# Patient Record
Sex: MALE | Race: BLACK OR AFRICAN AMERICAN | URBAN - METROPOLITAN AREA
[De-identification: names, ages, dates, MRNs, and addresses within clinical notes are randomized per-mention and may not be internally consistent; named-entity substitution may affect disease eponyms.]

---

## 2017-03-10 ENCOUNTER — OFFICE VISIT (OUTPATIENT)
Dept: FAMILY MEDICINE CLINIC | Age: 57
End: 2017-03-10

## 2017-03-10 VITALS
SYSTOLIC BLOOD PRESSURE: 110 MMHG | HEIGHT: 70 IN | DIASTOLIC BLOOD PRESSURE: 70 MMHG | HEART RATE: 75 BPM | RESPIRATION RATE: 18 BRPM | TEMPERATURE: 96.3 F | OXYGEN SATURATION: 97 % | BODY MASS INDEX: 24.17 KG/M2 | WEIGHT: 168.8 LBS

## 2017-03-10 DIAGNOSIS — E11.65 TYPE 2 DIABETES MELLITUS WITH HYPERGLYCEMIA, WITH LONG-TERM CURRENT USE OF INSULIN (HCC): Primary | ICD-10-CM

## 2017-03-10 DIAGNOSIS — Z79.4 TYPE 2 DIABETES MELLITUS WITH HYPERGLYCEMIA, WITH LONG-TERM CURRENT USE OF INSULIN (HCC): Primary | ICD-10-CM

## 2017-03-10 NOTE — PROGRESS NOTES
Juwan Clarke is a 59 y.o. male presents to office for DM, HTN and high cholesterol. 1. Have you been to the ER, urgent care clinic or hospitalized since your last visit? no  2. Have you seen any other providers outside of [facility] since your last visit? no  3. Have you had a Flu shot this year?  Pt declined       Health Maintenance items with a due date reviewed with patient:  Health Maintenance Due   Topic Date Due    EYE EXAM RETINAL OR DILATED Q1  02/03/1968    Pneumococcal 19-64 Medium Risk (1 of 1 - PPSV23) 02/03/1977    DTaP/Tdap/Td series (1 - Tdap) 02/03/1979    FOOT EXAM Q1  07/01/2016    INFLUENZA AGE 9 TO ADULT  08/01/2016

## 2017-03-10 NOTE — PROGRESS NOTES
Celio Muhammad is a 59 y.o.  male and presents for F/U for HTN, DM2 and high chol. Chief Complaint   Patient presents with    Diabetes    Hypertension    Cholesterol Problem     Subjective: Additional Concerns: none    Patient Active Problem List    Diagnosis Date Noted    Need for hepatitis C screening test 09/08/2015    DM (diabetes mellitus), type 2, uncontrolled ([facility] 05/01/2015    HTN (hypertension) 05/01/2015     Current Outpatient Prescriptions   Medication Sig Dispense Refill    lisinopril (PRINIVIL, ZESTRIL) 20 mg tablet Take 1 Tab by mouth daily. 90 Tab 1    insulin glargine (LANTUS SOLOSTAR) 100 unit/mL (3 mL) pen Inject 38 U SC  Indications: Diabetes Mellitus 10 Each 10    insulin aspart (NOVOLOG FLEXPEN) 100 unit/mL inpn Use 10 units with each meal 5 Pen 10    Insulin Needles, Disposable, (CHLOÉ PEN NEEDLE) 32 gauge x 5/32\" ndle Use 4 times daily with insulin 360 Pen Needle 0    atorvastatin (LIPITOR) 20 mg tablet Take 1 Tab by mouth daily. This replaces previously ordered Crestor. Indications: hyperlipidemia 90 Tab 1    glucose blood VI test strips (ONETOUCH VERIO) strip Pt to test 1x a day.  100 Strip 6    insulin lispro (HUMALOG) 100 unit/mL kwikpen Inject 6U SC before each meal 2 Package 10     No Known Allergies  Past Medical History:   Diagnosis Date    Diabetes ([facility]    [facility] H/O splenectomy     Hypertension      Past Surgical History:   Procedure Laterality Date    HX CHOLECYSTECTOMY       Family History   Problem Relation Age of Onset    Diabetes Mother     Hypertension Mother    [facility] Elevated Lipids Mother     Diabetes Father     Prostate Cancer Father      Social History   Substance Use Topics    Smoking status: Never Smoker    Smokeless tobacco: Never Used    Alcohol use No     ROS     General: negative for - chills, fatigue, fever, weight change  Endo: negative for - hot flashes, polydipsia/polyuria or temperature intolerance  Resp: negative for - cough, shortness of breath or wheezing  CV: negative for - chest pain, edema or palpitations  GI: negative for - abdominal pain, change in bowel habits, constipation, diarrhea or nausea/vomiting  MSK: negative for - joint pain, joint swelling or muscle pain  Neuro: negative for - confusion, headaches, seizures or weakness    Objective:  Vitals:    03/10/17 1306   BP: 110/70   Pulse: 75   Resp: 18   Temp: 96.3 °F (35.7 °C)   TempSrc: Oral   SpO2: 97%   Weight: 168 lb 12.8 oz (76.6 kg)   Height: 5' 10\" (1.778 m)   PainSc:   0 - No pain     PE    Alert, well appearing, and in no distress, oriented to person, place, and time and normal appearing weight  Mental status - alert, oriented to person, place, and time, normal mood, behavior, speech, dress, motor activity, and thought processes  Musculoskeletal - no joint tenderness, deformity or swelling  Extremities - peripheral pulses normal, no pedal edema, no clubbing or cyanosis    [facility]   Component Date Value Ref Range Status    LIPID PROFILE 11/25/2016        Final    Cholesterol, total 11/25/2016 220* <200 MG/DL Final    Triglyceride 11/25/2016 67  <150 MG/DL Final    HDL Cholesterol 11/25/2016 132* 40 - 60 MG/DL Final    LDL, calculated 11/25/2016 74.6  0 - 100 MG/DL Final    VLDL, calculated 11/25/2016 13.4  MG/DL Final    CHOL/HDL Ratio 11/25/2016 1.7  0 - 5.0   Final    WBC 11/25/2016 4.4* 4.6 - 13.2 K/uL Final    RBC 11/25/2016 5.38  4.70 - 5.50 M/uL Final    HGB 11/25/2016 15.6  13.0 - 16.0 g/dL Final    HCT 11/25/2016 46.1  36.0 - 48.0 % Final    MCV 11/25/2016 85.7  74.0 - 97.0 FL Final    MCH 11/25/2016 29.0  24.0 - 34.0 PG Final    MCHC 11/25/2016 33.8  31.0 - 37.0 g/dL Final    RDW 11/25/2016 12.9  11.6 - 14.5 % Final    PLATELET 97/51/1232 472  135 - 420 K/uL Final    MPV 11/25/2016 11.5  9.2 - 11.8 FL Final    NEUTROPHILS 11/25/2016 29* 40 - 73 % Final    LYMPHOCYTES 11/25/2016 60* 21 - 52 % Final  MONOCYTES 11/25/2016 8  3 - 10 % Final    EOSINOPHILS 11/25/2016 2  0 - 5 % Final    BASOPHILS 11/25/2016 1  0 - 2 % Final    ABS. NEUTROPHILS 11/25/2016 1.3* 1.8 - 8.0 K/UL Final    ABS. LYMPHOCYTES 11/25/2016 2.7  0.9 - 3.6 K/UL Final    ABS. MONOCYTES 11/25/2016 0.4  0.05 - 1.2 K/UL Final    ABS. EOSINOPHILS 11/25/2016 0.1  0.0 - 0.4 K/UL Final    ABS. BASOPHILS 11/25/2016 0.0  0.0 - 0.06 K/UL Final    DF 11/25/2016 AUTOMATED    Final    Sodium 11/25/2016 140  136 - 145 mmol/L Final    Potassium 11/25/2016 4.3  3.5 - 5.5 mmol/L Final    Chloride 11/25/2016 99* 100 - 108 mmol/L Final    CO2 11/25/2016 29  21 - 32 mmol/L Final    Anion gap 11/25/2016 12  3.0 - 18 mmol/L Final    Glucose 11/25/2016 344* 74 - 99 mg/dL Final    BUN 11/25/2016 20* 7.0 - 18 MG/DL Final    Creatinine 11/25/2016 1.28  0.6 - 1.3 MG/DL Final    BUN/Creatinine ratio 11/25/2016 16  12 - 20   Final    GFR est AA 11/25/2016 >60  >60 ml/min/1.73m2 Final    GFR est non-AA 11/25/2016 58* >60 ml/min/1.73m2 Final    Comment: (NOTE)  Estimated GFR is calculated using the Modification of Diet in Renal   Disease (MDRD) Study equation, reported for both  Americans   (GFRAA) and non- Americans (GFRNA), and normalized to 1.73m2   body surface area. The physician must decide which value applies to   the patient. The MDRD study equation should only be used in   individuals age 25 or older. It has not been validated for the   following: pregnant women, patients with serious comorbid conditions,   or on certain medications, or persons with extremes of body size,   muscle mass, or nutritional status.  Calcium 11/25/2016 9.2  8.5 - 10.1 MG/DL Final    Bilirubin, total 11/25/2016 0.5  0.2 - 1.0 MG/DL Final    ALT (SGPT) 11/25/2016 22  16 - 61 U/L Final    AST (SGOT) 11/25/2016 15  15 - 37 U/L Final    Alk.  phosphatase 11/25/2016 91  45 - 117 U/L Final    Protein, total 11/25/2016 8.2  6.4 - 8.2 g/dL Final    Albumin 11/25/2016 4.0  3.4 - 5.0 g/dL Final    Globulin 11/25/2016 4.2* 2.0 - 4.0 g/dL Final    A-G Ratio 11/25/2016 1.0  0.8 - 1.7   Final    Prostate Specific Ag 11/25/2016 1.3  0.0 - 4.0 ng/mL Final    TSH 11/25/2016 1.10  0.36 - 3.74 uIU/mL Final    Hemoglobin A1c 11/25/2016 >16.0* 4.2 - 5.6 % Final    Comment: (NOTE)  HbA1C Interpretive Ranges  <5.7              Normal  5.7 - 6.4         Consider Prediabetes  >6.5              Consider Diabetes      Est. average glucose 11/25/2016 CANNOT BE CALCULATED  mg/dL Final    Estimated average glucose results are not reported when the hemoglobin A1c is <5% or >15% since it has not been validated for values in these ranges. TESTS  Results for orders placed or performed during the hospital encounter of 11/25/16   LIPID PANEL   Result Value Ref Range    LIPID PROFILE          Cholesterol, total 220 (H) <200 MG/DL    Triglyceride 67 <150 MG/DL    HDL Cholesterol 132 (H) 40 - 60 MG/DL    LDL, calculated 74.6 0 - 100 MG/DL    VLDL, calculated 13.4 MG/DL    CHOL/HDL Ratio 1.7 0 - 5.0     CBC WITH AUTOMATED DIFF   Result Value Ref Range    WBC 4.4 (L) 4.6 - 13.2 K/uL    RBC 5.38 4.70 - 5.50 M/uL    HGB 15.6 13.0 - 16.0 g/dL    HCT 46.1 36.0 - 48.0 %    MCV 85.7 74.0 - 97.0 FL    MCH 29.0 24.0 - 34.0 PG    MCHC 33.8 31.0 - 37.0 g/dL    RDW 12.9 11.6 - 14.5 %    PLATELET 582 792 - 389 K/uL    MPV 11.5 9.2 - 11.8 FL    NEUTROPHILS 29 (L) 40 - 73 %    LYMPHOCYTES 60 (H) 21 - 52 %    MONOCYTES 8 3 - 10 %    EOSINOPHILS 2 0 - 5 %    BASOPHILS 1 0 - 2 %    ABS. NEUTROPHILS 1.3 (L) 1.8 - 8.0 K/UL    ABS. LYMPHOCYTES 2.7 0.9 - 3.6 K/UL    ABS. MONOCYTES 0.4 0.05 - 1.2 K/UL    ABS. EOSINOPHILS 0.1 0.0 - 0.4 K/UL    ABS.  BASOPHILS 0.0 0.0 - 0.06 K/UL    DF AUTOMATED     METABOLIC PANEL, COMPREHENSIVE   Result Value Ref Range    Sodium 140 136 - 145 mmol/L    Potassium 4.3 3.5 - 5.5 mmol/L    Chloride 99 (L) 100 - 108 mmol/L    CO2 29 21 - 32 mmol/L    Anion gap 12 3.0 - 18 mmol/L    Glucose 344 (H) 74 - 99 mg/dL    BUN 20 (H) 7.0 - 18 MG/DL    Creatinine 1.28 0.6 - 1.3 MG/DL    BUN/Creatinine ratio 16 12 - 20      GFR est AA >60 >60 ml/min/1.73m2    GFR est non-AA 58 (L) >60 ml/min/1.73m2    Calcium 9.2 8.5 - 10.1 MG/DL    Bilirubin, total 0.5 0.2 - 1.0 MG/DL    ALT (SGPT) 22 16 - 61 U/L    AST (SGOT) 15 15 - 37 U/L    Alk. phosphatase 91 45 - 117 U/L    Protein, total 8.2 6.4 - 8.2 g/dL    Albumin 4.0 3.4 - 5.0 g/dL    Globulin 4.2 (H) 2.0 - 4.0 g/dL    A-G Ratio 1.0 0.8 - 1.7     PSA DIAGNOSTIC (PROSTATIC SPECIFIC AG)   Result Value Ref Range    Prostate Specific Ag 1.3 0.0 - 4.0 ng/mL   TSH 3RD GENERATION   Result Value Ref Range    TSH 1.10 0.36 - 3.74 uIU/mL   HEMOGLOBIN A1C WITH EAG   Result Value Ref Range    Hemoglobin A1c >16.0 (H) 4.2 - 5.6 %    Est. average glucose CANNOT BE CALCULATED mg/dL     Assessment/Plan:      DM2 uncontrolled - monitoring labs as ordered. Lab review: no lab studies available for review at time of visit    I have discussed the diagnosis with the patient and the intended plan as seen in the above orders. The patient has received an after-visit summary and questions were answered concerning future plans. I have discussed medication side effects and warnings with the patient as well. I have reviewed the plan of care with the patient, accepted their input and they are in agreement with the treatment goals.      F/U as needed    Raiza Durant MD

## 2017-03-10 NOTE — MR AVS SNAPSHOT
Visit Information Date & Time Provider Department Dept. Phone Encounter #  
 3/10/2017 12:45 PM Ruben Denton MD [facility] [place] [phone] [id number] Upcoming Health Maintenance Date Due  
 EYE EXAM RETINAL OR DILATED Q1 2/3/1968 Pneumococcal 19-64 Medium Risk (1 of 1 - PPSV23) 2/3/1977 DTaP/Tdap/Td series (1 - Tdap) 2/3/1979 FOOT EXAM Q1 7/1/2016 HEMOGLOBIN A1C Q6M 5/25/2017 MICROALBUMIN Q1 7/6/2017 LIPID PANEL Q1 11/25/2017 COLONOSCOPY 3/6/2027 Allergies as of 3/10/2017  Review Complete On: 3/10/2017 By: Alice Ferris LPN No Known Allergies Current Immunizations  Never Reviewed No immunizations on file. Not reviewed this visit You Were Diagnosed With   
  
 Codes Comments Type 2 diabetes mellitus with hyperglycemia, with long-term current use of insulin (HCC)    -  Primary ICD-10-CM: E11.65, Z79.4 ICD-9-CM: 250.00, 790.29, V58.67 Vitals BP Pulse Temp Resp Height(growth percentile) Weight(growth percentile) 110/70 (BP 1 Location: Right arm, BP Patient Position: Sitting) 75 96.3 °F (35.7 °C) (Oral) 18 5' 10\" (1.778 m) 168 lb 12.8 oz (76.6 kg) SpO2 BMI Smoking Status 97% 24.22 kg/m2 Never Smoker BMI and BSA Data Body Mass Index Body Surface Area  
 24.22 kg/m 2 1.95 m 2 Preferred Pharmacy Pharmacy Name Phone [facility] [phone] Your Updated Medication List  
  
   
This list is accurate as of: 3/10/17  1:17 PM.  Always use your most recent med list.  
  
  
  
  
 atorvastatin 20 mg tablet Commonly known as:  LIPITOR Take 1 Tab by mouth daily. This replaces previously ordered Crestor. Indications: hyperlipidemia  
  
 glucose blood VI test strips strip Commonly known as:  Ramírez Yung Pt to test 1x a day. insulin aspart 100 unit/mL Inpn Commonly known as:  Veryl Poke Use 10 units with each meal  
  
 insulin glargine 100 unit/mL (3 mL) pen Commonly known as:  LANTUS SOLOSTAR Inject 38 U SC  Indications: Diabetes Mellitus  
  
 insulin lispro 100 unit/mL kwikpen Commonly known as:  HUMALOG Inject 6U SC before each meal  
  
 Insulin Needles (Disposable) 32 gauge x 5/32\" Ndle Commonly known as:  Edith Pen Needle Use 4 times daily with insulin  
  
 lisinopril 20 mg tablet Commonly known as:  Beatriz [place] Take 1 Tab by mouth daily. To-Do List   
 03/10/2017 Lab:  HEMOGLOBIN A1C WITH EAG   
  
 03/10/2017 Lab:  LIPID PANEL   
  
 03/10/2017 Lab:  METABOLIC PANEL, COMPREHENSIVE Introducing [facility]! [employer] introduces [employer] patient portal. Now you can access parts of your medical record, email your doctor's office, and request medication refills online. 1. In your internet browser, go to [email or web address]. [email or web address] 2. Click on the First Time User? Click Here link in the Sign In box. You will see the New Member Sign Up page. 3. Enter your [employer] Access Code exactly as it appears below. You will not need to use this code after youve completed the sign-up process. If you do not sign up before the expiration date, you must request a new code. · [employer] Access Code: [email or web address] Expires: 6/8/2017  1:17 PM 
 
4. Enter the last four digits of your Social Security Number (xxxx) and Date of Birth (mm/dd/yyyy) as indicated and click Submit. You will be taken to the next sign-up page. 5. Create a [employer] ID. This will be your [employer] login ID and cannot be changed, so think of one that is secure and easy to remember. 6. Create a [employer] password. You can change your password at any time. 7. Enter your Password Reset Question and Answer. This can be used at a later time if you forget your password. 8. Enter your e-mail address. You will receive e-mail notification when new information is available in [address] 9. Click Sign Up. You can now view and download portions of your medical record. 10. Click the Download Summary menu link to download a portable copy of your medical information. If you have questions, please visit the Frequently Asked Questions section of the [employer] website. Remember, [employer] is NOT to be used for urgent needs. For medical emergencies, dial 911. Now available from your iPhone and Android! Please provide this summary of care documentation to your next provider. Your primary care clinician is listed as Clemente Talley. If you have any questions after today's visit, please call [phone].

## 2017-03-11 ENCOUNTER — HOSPITAL ENCOUNTER (OUTPATIENT)
Dept: LAB | Age: 57
Discharge: HOME | End: 2017-03-11
Payer: COMMERCIAL

## 2017-03-11 DIAGNOSIS — Z79.4 TYPE 2 DIABETES MELLITUS WITH HYPERGLYCEMIA, WITH LONG-TERM CURRENT USE OF INSULIN (HCC): ICD-10-CM

## 2017-03-11 DIAGNOSIS — E11.65 TYPE 2 DIABETES MELLITUS WITH HYPERGLYCEMIA, WITH LONG-TERM CURRENT USE OF INSULIN (HCC): ICD-10-CM

## 2017-03-11 LAB
ALBUMIN SERPL BCP-MCNC: 3.9 G/DL (ref 3.4–5)
ALBUMIN/GLOB SERPL: 1.1 {RATIO} (ref 0.8–1.7)
ALP SERPL-CCNC: 75 U/L (ref 45–117)
ALT SERPL-CCNC: 30 U/L (ref 16–61)
ANION GAP BLD CALC-SCNC: 10 MMOL/L (ref 3–18)
AST SERPL W P-5'-P-CCNC: 20 U/L (ref 15–37)
BILIRUB SERPL-MCNC: 0.4 MG/DL (ref 0.2–1)
BUN SERPL-MCNC: 17 MG/DL (ref 7–18)
BUN/CREAT SERPL: 15 (ref 12–20)
CALCIUM SERPL-MCNC: 8.8 MG/DL (ref 8.5–10.1)
CHLORIDE SERPL-SCNC: 103 MMOL/L (ref 100–108)
CHOLEST SERPL-MCNC: 217 MG/DL
CO2 SERPL-SCNC: 29 MMOL/L (ref 21–32)
CREAT SERPL-MCNC: 1.11 MG/DL (ref 0.6–1.3)
EST. AVERAGE GLUCOSE BLD GHB EST-MCNC: 372 MG/DL
GLOBULIN SER CALC-MCNC: 3.6 G/DL (ref 2–4)
GLUCOSE SERPL-MCNC: 363 MG/DL (ref 74–99)
HBA1C MFR BLD: 14.6 % (ref 4.2–5.6)
HDLC SERPL-MCNC: 119 MG/DL (ref 40–60)
HDLC SERPL: 1.8 {RATIO} (ref 0–5)
LDLC SERPL CALC-MCNC: 88 MG/DL (ref 0–100)
LIPID PROFILE,FLP: ABNORMAL
POTASSIUM SERPL-SCNC: 4.5 MMOL/L (ref 3.5–5.5)
PROT SERPL-MCNC: 7.5 G/DL (ref 6.4–8.2)
SODIUM SERPL-SCNC: 142 MMOL/L (ref 136–145)
TRIGL SERPL-MCNC: 50 MG/DL (ref ?–150)
VLDLC SERPL CALC-MCNC: 10 MG/DL

## 2017-03-11 PROCEDURE — 80061 LIPID PANEL: CPT | Performed by: FAMILY MEDICINE

## 2017-03-11 PROCEDURE — 36415 COLL VENOUS BLD VENIPUNCTURE: CPT | Performed by: FAMILY MEDICINE

## 2017-03-11 PROCEDURE — 83036 HEMOGLOBIN GLYCOSYLATED A1C: CPT | Performed by: FAMILY MEDICINE

## 2017-03-11 PROCEDURE — 80053 COMPREHEN METABOLIC PANEL: CPT | Performed by: FAMILY MEDICINE

## 2017-03-12 NOTE — PROGRESS NOTES
Pls inform patient his A1C is lower but still high at 14.6 from greater than 16. He was referred to Dr. Sammi Mueller before for this. Im not sure if he went or not or if he was   Satisfied with them. I feel I need to refer him back to Endo for uncontrolled DM2 on basal bolus insulin. In the meantime, please increase lantus another 10 units if he is not having episodes of low blood sugar.

## 2017-03-13 ENCOUNTER — TELEPHONE (OUTPATIENT)
Dept: FAMILY MEDICINE CLINIC | Age: 57
End: 2017-03-13

## 2017-03-13 DIAGNOSIS — E11.8 UNCONTROLLED TYPE 2 DIABETES MELLITUS WITH COMPLICATION, UNSPECIFIED LONG TERM INSULIN USE STATUS: Primary | ICD-10-CM

## 2017-03-13 DIAGNOSIS — E11.65 UNCONTROLLED TYPE 2 DIABETES MELLITUS WITH COMPLICATION, UNSPECIFIED LONG TERM INSULIN USE STATUS: Primary | ICD-10-CM

## 2017-03-13 NOTE — PATIENT INSTRUCTIONS

## 2017-03-13 NOTE — TELEPHONE ENCOUNTER
----- Message from Rosanna Story MD sent at 3/12/2017  7:33 PM EDT -----  Pls inform patient his A1C is lower but still high at 14.6 from greater than 16. He was referred to Dr. Kandy Perkins before for this. Im not sure if he went or not or if he was   Satisfied with them. I feel I need to refer him back to Endo for uncontrolled DM2 on basal bolus insulin. In the meantime, please increase lantus another 10 units if he is not having episodes of low blood sugar.

## 2017-03-13 NOTE — TELEPHONE ENCOUNTER
Patient has been called and notified of results. Patient stated that he will increase his insulin and that he would like another referral to Endo. Referral has been pended. Please advise.

## 2017-05-21 NOTE — TELEPHONE ENCOUNTER
Per record patient has existing referras to Endo 3/16/17(Malorie) and 11/25/16 (Yogesh Taylor) also Clesridevi Taylor the previous year.

## 2017-09-05 ENCOUNTER — OFFICE VISIT (OUTPATIENT)
Dept: FAMILY MEDICINE CLINIC | Age: 57
End: 2017-09-05

## 2017-09-05 ENCOUNTER — HOSPITAL ENCOUNTER (OUTPATIENT)
Dept: LAB | Age: 57
Discharge: HOME | End: 2017-09-05
Payer: COMMERCIAL

## 2017-09-05 VITALS
DIASTOLIC BLOOD PRESSURE: 83 MMHG | HEART RATE: 82 BPM | SYSTOLIC BLOOD PRESSURE: 119 MMHG | TEMPERATURE: 96.7 F | WEIGHT: 154.8 LBS | OXYGEN SATURATION: 99 % | HEIGHT: 70 IN | RESPIRATION RATE: 18 BRPM | BODY MASS INDEX: 22.16 KG/M2

## 2017-09-05 DIAGNOSIS — E78.5 HYPERLIPIDEMIA, UNSPECIFIED HYPERLIPIDEMIA TYPE: ICD-10-CM

## 2017-09-05 DIAGNOSIS — R63.4 WEIGHT LOSS, UNINTENTIONAL: Primary | ICD-10-CM

## 2017-09-05 DIAGNOSIS — R63.4 WEIGHT LOSS, UNINTENTIONAL: ICD-10-CM

## 2017-09-05 LAB
ALBUMIN SERPL-MCNC: 3.8 G/DL (ref 3.4–5)
ALBUMIN/GLOB SERPL: 1.1 {RATIO} (ref 0.8–1.7)
ALP SERPL-CCNC: 66 U/L (ref 45–117)
ALT SERPL-CCNC: 19 U/L (ref 16–61)
ANION GAP SERPL CALC-SCNC: 8 MMOL/L (ref 3–18)
AST SERPL-CCNC: 13 U/L (ref 15–37)
BASOPHILS # BLD: 0 K/UL (ref 0–0.06)
BASOPHILS NFR BLD: 0 % (ref 0–2)
BILIRUB SERPL-MCNC: 0.5 MG/DL (ref 0.2–1)
BUN SERPL-MCNC: 12 MG/DL (ref 7–18)
BUN/CREAT SERPL: 13 (ref 12–20)
CALCIUM SERPL-MCNC: 8.8 MG/DL (ref 8.5–10.1)
CHLORIDE SERPL-SCNC: 106 MMOL/L (ref 100–108)
CHOLEST SERPL-MCNC: 177 MG/DL
CO2 SERPL-SCNC: 28 MMOL/L (ref 21–32)
CREAT SERPL-MCNC: 0.94 MG/DL (ref 0.6–1.3)
CREAT UR-MCNC: 177.6 MG/DL (ref 30–125)
DIFFERENTIAL METHOD BLD: ABNORMAL
EOSINOPHIL # BLD: 0.1 K/UL (ref 0–0.4)
EOSINOPHIL NFR BLD: 2 % (ref 0–5)
ERYTHROCYTE [DISTWIDTH] IN BLOOD BY AUTOMATED COUNT: 13.2 % (ref 11.6–14.5)
EST. AVERAGE GLUCOSE BLD GHB EST-MCNC: 349 MG/DL
GLOBULIN SER CALC-MCNC: 3.4 G/DL (ref 2–4)
GLUCOSE SERPL-MCNC: 157 MG/DL (ref 74–99)
HBA1C MFR BLD: 13.8 % (ref 4.2–5.6)
HCT VFR BLD AUTO: 45.8 % (ref 36–48)
HDLC SERPL-MCNC: 112 MG/DL (ref 40–60)
HDLC SERPL: 1.6 {RATIO} (ref 0–5)
HGB BLD-MCNC: 15.7 G/DL (ref 13–16)
LDLC SERPL CALC-MCNC: 57 MG/DL (ref 0–100)
LIPID PROFILE,FLP: ABNORMAL
LYMPHOCYTES # BLD: 2.3 K/UL (ref 0.9–3.6)
LYMPHOCYTES NFR BLD: 56 % (ref 21–52)
MCH RBC QN AUTO: 29 PG (ref 24–34)
MCHC RBC AUTO-ENTMCNC: 34.3 G/DL (ref 31–37)
MCV RBC AUTO: 84.7 FL (ref 74–97)
MICROALBUMIN UR-MCNC: 0.9 MG/DL (ref 0–3)
MICROALBUMIN/CREAT UR-RTO: 5 MG/G (ref 0–30)
MONOCYTES # BLD: 0.4 K/UL (ref 0.05–1.2)
MONOCYTES NFR BLD: 9 % (ref 3–10)
NEUTS SEG # BLD: 1.4 K/UL (ref 1.8–8)
NEUTS SEG NFR BLD: 33 % (ref 40–73)
PLATELET # BLD AUTO: 322 K/UL (ref 135–420)
PMV BLD AUTO: 10.6 FL (ref 9.2–11.8)
POTASSIUM SERPL-SCNC: 4.9 MMOL/L (ref 3.5–5.5)
PROT SERPL-MCNC: 7.2 G/DL (ref 6.4–8.2)
PSA SERPL-MCNC: 3.7 NG/ML (ref 0–4)
RBC # BLD AUTO: 5.41 M/UL (ref 4.7–5.5)
SODIUM SERPL-SCNC: 142 MMOL/L (ref 136–145)
TRIGL SERPL-MCNC: 40 MG/DL (ref ?–150)
TSH SERPL DL<=0.05 MIU/L-ACNC: 1.62 UIU/ML (ref 0.36–3.74)
VLDLC SERPL CALC-MCNC: 8 MG/DL
WBC # BLD AUTO: 4.1 K/UL (ref 4.6–13.2)

## 2017-09-05 PROCEDURE — 85025 COMPLETE CBC W/AUTO DIFF WBC: CPT | Performed by: FAMILY MEDICINE

## 2017-09-05 PROCEDURE — 83036 HEMOGLOBIN GLYCOSYLATED A1C: CPT | Performed by: FAMILY MEDICINE

## 2017-09-05 PROCEDURE — 84153 ASSAY OF PSA TOTAL: CPT | Performed by: FAMILY MEDICINE

## 2017-09-05 PROCEDURE — 84443 ASSAY THYROID STIM HORMONE: CPT | Performed by: FAMILY MEDICINE

## 2017-09-05 PROCEDURE — 80061 LIPID PANEL: CPT | Performed by: FAMILY MEDICINE

## 2017-09-05 PROCEDURE — 82043 UR ALBUMIN QUANTITATIVE: CPT | Performed by: FAMILY MEDICINE

## 2017-09-05 PROCEDURE — 80053 COMPREHEN METABOLIC PANEL: CPT | Performed by: FAMILY MEDICINE

## 2017-09-05 RX ORDER — INSULIN ASPART 100 [IU]/ML
INJECTION, SOLUTION INTRAVENOUS; SUBCUTANEOUS
Qty: 5 PEN | Refills: 10 | Status: SHIPPED | OUTPATIENT
Start: 2017-09-05 | End: 2018-06-22 | Stop reason: SDUPTHER

## 2017-09-05 RX ORDER — ATORVASTATIN CALCIUM 20 MG/1
20 TABLET, FILM COATED ORAL DAILY
Qty: 90 TAB | Refills: 1 | Status: SHIPPED | OUTPATIENT
Start: 2017-09-05 | End: 2018-06-22 | Stop reason: SDUPTHER

## 2017-09-05 RX ORDER — PEN NEEDLE, DIABETIC 31 GX3/16"
NEEDLE, DISPOSABLE MISCELLANEOUS
Qty: 360 PEN NEEDLE | Refills: 0 | Status: SHIPPED | OUTPATIENT
Start: 2017-09-05 | End: 2018-06-22 | Stop reason: SDUPTHER

## 2017-09-05 RX ORDER — LISINOPRIL 20 MG/1
20 TABLET ORAL DAILY
Qty: 90 TAB | Refills: 1 | Status: SHIPPED | OUTPATIENT
Start: 2017-09-05 | End: 2018-06-22 | Stop reason: SDUPTHER

## 2017-09-05 RX ORDER — INSULIN GLARGINE 100 [IU]/ML
INJECTION, SOLUTION SUBCUTANEOUS
Qty: 10 PEN | Refills: 5 | Status: SHIPPED | OUTPATIENT
Start: 2017-09-05 | End: 2018-02-09 | Stop reason: SDUPTHER

## 2017-09-05 NOTE — PROGRESS NOTES
[facility] 59 [facility]  [facility] and presents with     Chief Complaint   Patient presents with    Diabetes     Subjective: Additional Concerns: none    Patient Active Problem List    Diagnosis Date Noted    Need for hepatitis C screening test 09/08/2015    DM (diabetes mellitus), type 2, uncontrolled ([facility] [address] 05/01/2015    HTN (hypertension) 05/01/2015     Current Outpatient Prescriptions   Medication Sig Dispense Refill    lisinopril (PRINIVIL, ZESTRIL) 20 mg tablet Take 1 Tab by mouth daily. 90 Tab 1    insulin glargine (LANTUS,BASAGLAR) 100 unit/mL (3 mL) inpn Inject 38 U SC  Indications: Diabetes Mellitus 10 Pen 5    insulin aspart (NOVOLOG FLEXPEN) 100 unit/mL inpn Use 10 units with each meal 5 Pen 10    Insulin Needles, Disposable, (CHLOÉ PEN NEEDLE) 32 gauge x 5/32\" ndle Use 4 times daily with insulin 360 Pen Needle 0    atorvastatin (LIPITOR) 20 mg tablet Take 1 Tab by mouth daily. This replaces previously ordered Crestor. Indications: hyperlipidemia 90 Tab 1    glucose blood VI test strips (ONETOUCH VERIO) strip Pt to test 1x a day.  100 Strip 6    insulin lispro (HUMALOG) 100 unit/mL kwikpen Inject 6U SC before each meal 2 Package 10     No Known Allergies  Past Medical History:   Diagnosis Date    Diabetes ([facility] [place] [facility]    Glynn Willett H/O splenectomy     Hypertension      Past Surgical History:   Procedure Laterality Date    HX CHOLECYSTECTOMY       Family History   Problem Relation Age of Onset    Diabetes Mother     Hypertension Mother    Glynn Willett Elevated Lipids Mother     Diabetes Father     Prostate Cancer Father      Social History   Substance Use Topics    Smoking status: Never Smoker    Smokeless tobacco: Never Used    Alcohol use No     ROS     General: negative for - chills, fatigue, fever, weight change  Endo: negative for - hot flashes, polydipsia/polyuria or temperature intolerance  Resp: negative for - cough, shortness of breath or wheezing  CV: negative for - chest pain, edema or palpitations  MSK: negative for - joint pain, joint swelling or muscle pain  Neuro: negative for - confusion, headaches, seizures or weakness    Objective:  Vitals:    09/05/17 0720   BP: 119/83   Pulse: 82   Resp: 18   Temp: 96.7 °F (35.9 °C)   TempSrc: Oral   SpO2: 99%   Weight: 154 lb 12.8 oz (70.2 kg)   Height: 5' 10\" (1.778 m)   PainSc:   0 - No pain     PE    alert, well appearing, and in no distress, oriented to person, place, and time and normal appearing weight  General appearance - alert, well appearing, and in no distress  Mental status - alert, oriented to person, place, and time, normal mood, behavior, speech, dress, motor activity, and thought processes  Chest - clear to auscultation, no wheezes, rales or rhonchi, symmetric air entry  Heart - normal rate, regular rhythm, normal S1, S2, no murmurs, rubs, clicks or gallops  Extremities - peripheral pulses normal, no pedal edema, no clubbing or cyanosis    [facility] on [facility]11[facility]   Component Date Value Ref Range Status    LIPID PROFILE 03/11/2017        Final    Cholesterol, total 03/11/2017 217* <200 MG/DL Final    Triglyceride 03/11/2017 50  <150 MG/DL Final    Comment: The drugs N-acetylcysteine (NAC) and  Metamiszole have been found to cause falsely  low results in this chemical assay. Please  be sure to submit blood samples obtained  BEFORE administration of either of these  drugs to assure correct results.       HDL Cholesterol 03/11/2017 119* 40 - 60 MG/DL Final    LDL, calculated 03/11/2017 88  0 - 100 MG/DL Final    VLDL, calculated 03/11/2017 10  MG/DL Final    CHOL/HDL Ratio 03/11/2017 1.8  0 - 5.0   Final    Sodium 03/11/2017 142  136 - 145 mmol/L Final    Potassium 03/11/2017 4.5  3.5 - 5.5 mmol/L Final    Chloride 03/11/2017 103  100 - 108 mmol/L Final    CO2 03/11/2017 29  21 - 32 mmol/L Final    Anion gap 03/11/2017 10  3.0 - 18 mmol/L Final    Glucose 03/11/2017 363* 74 - 99 mg/dL Final    BUN 03/11/2017 17  7.0 - 18 MG/DL Final    Creatinine 03/11/2017 1.11  0.6 - 1.3 MG/DL Final    BUN/Creatinine ratio 03/11/2017 15  12 - 20   Final    GFR est AA 03/11/2017 >60  >60 ml/min/1.73m2 Final    GFR est non-AA 03/11/2017 >60  >60 ml/min/1.73m2 Final    Comment: (NOTE)  Estimated GFR is calculated using the Modification of Diet in Renal   Disease (MDRD) Study equation, reported for both  Americans   (GFRAA) and non- Americans (GFRNA), and normalized to 1.73m2   body surface area. The physician must decide which value applies to   the patient. The MDRD study equation should only be used in   individuals age 25 or older. It has not been validated for the   following: pregnant women, patients with serious comorbid conditions,   or on certain medications, or persons with extremes of body size,   muscle mass, or nutritional status.  Calcium 03/11/2017 8.8  8.5 - 10.1 MG/DL Final    Bilirubin, total 03/11/2017 0.4  0.2 - 1.0 MG/DL Final    ALT (SGPT) 03/11/2017 30  16 - 61 U/L Final    AST (SGOT) 03/11/2017 20  15 - 37 U/L Final    Alk. phosphatase 03/11/2017 75  45 - 117 U/L Final    Protein, total 03/11/2017 7.5  6.4 - 8.2 g/dL Final    Albumin 03/11/2017 3.9  3.4 - 5.0 g/dL Final    Globulin 03/11/2017 3.6  2.0 - 4.0 g/dL Final    A-G Ratio 03/11/2017 1.1  0.8 - 1.7   Final    Hemoglobin A1c 03/11/2017 14.6* 4.2 - 5.6 % Final    Comment: (NOTE)  HbA1C Interpretive Ranges  <5.7              Normal  5.7 - 6.4         Consider Prediabetes  >6.5              Consider Diabetes      Est. average glucose 03/11/2017 372  mg/dL Final    Comment: (NOTE)  The eAG should be interpreted with patient characteristics in mind   since ethnicity, interindividual differences, red cell lifespan,   variation in rates of glycation, etc. may affect the validity of the   calculation.          TESTS  Results for orders placed or performed during the hospital encounter of 03/11/17   LIPID PANEL   Result Value Ref Range    LIPID PROFILE          Cholesterol, total 217 (H) <200 MG/DL    Triglyceride 50 <150 MG/DL    HDL Cholesterol 119 (H) 40 - 60 MG/DL    LDL, calculated 88 0 - 100 MG/DL    VLDL, calculated 10 MG/DL    CHOL/HDL Ratio 1.8 0 - 5.0     METABOLIC PANEL, COMPREHENSIVE   Result Value Ref Range    Sodium 142 136 - 145 mmol/L    Potassium 4.5 3.5 - 5.5 mmol/L    Chloride 103 100 - 108 mmol/L    CO2 29 21 - 32 mmol/L    Anion gap 10 3.0 - 18 mmol/L    Glucose 363 (H) 74 - 99 mg/dL    BUN 17 7.0 - 18 MG/DL    Creatinine 1.11 0.6 - 1.3 MG/DL    BUN/Creatinine ratio 15 12 - 20      GFR est AA >60 >60 ml/min/1.73m2    GFR est non-AA >60 >60 ml/min/1.73m2    Calcium 8.8 8.5 - 10.1 MG/DL    Bilirubin, total 0.4 0.2 - 1.0 MG/DL    ALT (SGPT) 30 16 - 61 U/L    AST (SGOT) 20 15 - 37 U/L    Alk. phosphatase 75 45 - 117 U/L    Protein, total 7.5 6.4 - 8.2 g/dL    Albumin 3.9 3.4 - 5.0 g/dL    Globulin 3.6 2.0 - 4.0 g/dL    A-G Ratio 1.1 0.8 - 1.7     HEMOGLOBIN A1C WITH EAG   Result Value Ref Range    Hemoglobin A1c 14.6 (H) 4.2 - 5.6 %    Est. average glucose 372 mg/dL     Assessment/Plan:      1. Hyperlipidemia, unspecified hyperlipidemia type  - atorvastatin (LIPITOR) 20 mg tablet; Take 1 Tab by mouth daily. This replaces previously ordered Crestor. Indications: hyperlipidemia  Dispense: 90 Tab; Refill: 1  - LIPID PANEL; Future    2. DM2 uncontrolled - Encouraged patient to go to Endo. We will call patient for resutls and further plan. - MICROALBUMIN, UR, RAND W/ MICROALBUMIN/CREA RATIO; Future  - METABOLIC PANEL, COMPREHENSIVE; Future  - HEMOGLOBIN A1C WITH EAG; Future    3. Unintentional weight loss - recent colonoscopy normal.   - PSA - SCREENING (); Future  CBC and TSH     Lab review: orders written for new lab studies as appropriate; see orders. I have discussed the diagnosis with the patient and the intended plan as seen in the above orders.   The patient has received an after-visit summary and questions were answered concerning future plans. I have discussed medication side effects and warnings with the patient as well. I have reviewed the plan of care with the patient, accepted their input and they are in agreement with the treatment goals. F/U as needed, Routine every 3 months.      Melonie Vaughn MD

## 2017-09-05 NOTE — PROGRESS NOTES
Fany Mckeon is a 59 y.o. male presents to office for DM. 1. Have you been to the ER, urgent care clinic or hospitalized since your last visit?  NO        Health Maintenance items with a due date reviewed with patient:  Health Maintenance Due   Topic Date Due    EYE EXAM RETINAL OR DILATED Q1  02/03/1968    Pneumococcal 19-64 Medium Risk (1 of 1 - PPSV23) 02/03/1977    DTaP/Tdap/Td series (1 - Tdap) 02/03/1979    FOOT EXAM Q1  07/01/2016    MICROALBUMIN Q1  07/06/2017    INFLUENZA AGE 9 TO ADULT  08/01/2017    HEMOGLOBIN A1C Q6M  09/11/2017

## 2017-09-05 NOTE — PATIENT INSTRUCTIONS
High Cholesterol: Care Instructions  Your Care Instructions  Cholesterol is a type of fat in your blood. It is needed for many body functions, such as making new cells. Cholesterol is made by your body. It also comes from food you eat. High cholesterol means that you have too much of the fat in your blood. This raises your risk of a heart attack and stroke. LDL and HDL are part of your total cholesterol. LDL is the \"bad\" cholesterol. High LDL can raise your risk for heart disease, heart attack, and stroke. HDL is the \"good\" cholesterol. It helps clear bad cholesterol from the body. High HDL is linked with a lower risk of heart disease, heart attack, and stroke. Your cholesterol levels help your doctor find out your risk for having a heart attack or stroke. You and your doctor can talk about whether you need to lower your risk and what treatment is best for you. A heart-healthy lifestyle along with medicines can help lower your cholesterol and your risk. The way you choose to lower your risk will depend on how high your risk is for heart attack and stroke. It will also depend on how you feel about taking medicines. Follow-up care is a key part of your treatment and safety. Be sure to make and go to all appointments, and call your doctor if you are having problems. It's also a good idea to know your test results and keep a list of the medicines you take. How can you care for yourself at home? · Eat a variety of foods every day. Good choices include fruits, vegetables, whole grains (like oatmeal), dried beans and peas, nuts and seeds, soy products (like tofu), and fat-free or low-fat dairy products. · Replace butter, margarine, and hydrogenated or partially hydrogenated oils with olive and canola oils. (Canola oil margarine without trans fat is fine.)  · Replace red meat with fish, poultry, and soy protein (like tofu). · Limit processed and packaged foods like chips, crackers, and cookies.   · Bake, broil, or steam foods. Don't che them. · Be physically active. Get at least 30 minutes of exercise on most days of the week. Walking is a good choice. You also may want to do other activities, such as running, swimming, cycling, or playing tennis or team sports. · Stay at a healthy weight or lose weight by making the changes in eating and physical activity listed above. Losing just a small amount of weight, even 5 to 10 pounds, can reduce your risk for having a heart attack or stroke. · Do not smoke. When should you call for help? Watch closely for changes in your health, and be sure to contact your doctor if:  · You need help making lifestyle changes. · You have questions about your medicine. Where can you learn more? Go to http://antoniovArmourjuan luis.info/. Enter Y067 in the search box to learn more about \"High Cholesterol: Care Instructions. \"  Current as of: April 3, 2017  Content Version: 11.3  © 4348-1009 [employer]. Care instructions adapted under license by [employer] (which disclaims liability or warranty for this information). If you have questions about a medical condition or this instruction, always ask your healthcare professional. [address] any warranty or liability for your use of this information. Learning About Diabetes Food Guidelines  Your Care Instructions  Meal planning is important to manage diabetes. It helps keep your blood sugar at a target level (which you set with your doctor). You don't have to eat special foods. You can eat what your family eats, including sweets once in a while. But you do have to pay attention to how often you eat and how much you eat of certain foods. You may want to work with a dietitian or a certified diabetes educator (CDE) to help you plan meals and snacks. A dietitian or CDE can also help you lose weight if that is one of your goals. What should you know about eating carbs?   Managing the amount of carbohydrate (carbs) you eat is an important part of healthy meals when you have diabetes. Carbohydrate is found in many foods. · Learn which foods have carbs. And learn the amounts of carbs in different foods. ¨ Bread, cereal, pasta, and rice have about 15 grams of carbs in a serving. A serving is 1 slice of bread (1 ounce), ½ cup of cooked cereal, or 1/3 cup of cooked pasta or rice. ¨ Fruits have 15 grams of carbs in a serving. A serving is 1 small fresh fruit, such as an apple or orange; ½ of a banana; ½ cup of cooked or canned fruit; ½ cup of fruit juice; 1 cup of melon or raspberries; or 2 tablespoons of dried fruit. ¨ Milk and no-sugar-added yogurt have 15 grams of carbs in a serving. A serving is 1 cup of milk or 2/3 cup of no-sugar-added yogurt. ¨ Starchy vegetables have 15 grams of carbs in a serving. A serving is ½ cup of mashed potatoes or sweet potato; 1 cup winter squash; ½ of a small baked potato; ½ cup of cooked beans; or ½ cup cooked corn or green peas. · Learn how much carbs to eat each day and at each meal. A dietitian or CDE can teach you how to keep track of the amount of carbs you eat. This is called carbohydrate counting. · If you are not sure how to count carbohydrate grams, use the Plate Method to plan meals. It is a good, quick way to make sure that you have a balanced meal. It also helps you spread carbs throughout the day. ¨ Divide your plate by types of foods. Put non-starchy vegetables on half the plate, meat or other protein food on one-quarter of the plate, and a grain or starchy vegetable in the final quarter of the plate. To this you can add a small piece of fruit and 1 cup of milk or yogurt, depending on how many carbs you are supposed to eat at a meal.  · Try to eat about the same amount of carbs at each meal. Do not \"save up\" your daily allowance of carbs to eat at one meal.  · Proteins have very little or no carbs per serving.  Examples of proteins are beef, chicken, turkey, fish, eggs, tofu, cheese, cottage cheese, and peanut butter. A serving size of meat is 3 ounces, which is about the size of a deck of cards. Examples of meat substitute serving sizes (equal to 1 ounce of meat) are 1/4 cup of cottage cheese, 1 egg, 1 tablespoon of peanut butter, and ½ cup of tofu. How can you eat out and still eat healthy? · Learn to estimate the serving sizes of foods that have carbohydrate. If you measure food at home, it will be easier to estimate the amount in a serving of restaurant food. · If the meal you order has too much carbohydrate (such as potatoes, corn, or baked beans), ask to have a low-carbohydrate food instead. Ask for a salad or green vegetables. · If you use insulin, check your blood sugar before and after eating out to help you plan how much to eat in the future. · If you eat more carbohydrate at a meal than you had planned, take a walk or do other exercise. This will help lower your blood sugar. What else should you know? · Limit saturated fat, such as the fat from meat and dairy products. This is a healthy choice because people who have diabetes are at higher risk of heart disease. So choose lean cuts of meat and nonfat or low-fat dairy products. Use olive or canola oil instead of butter or shortening when cooking. · Don't skip meals. Your blood sugar may drop too low if you skip meals and take insulin or certain medicines for diabetes. · Check with your doctor before you drink alcohol. Alcohol can cause your blood sugar to drop too low. Alcohol can also cause a bad reaction if you take certain diabetes medicines. Follow-up care is a key part of your treatment and safety. Be sure to make and go to all appointments, and call your doctor if you are having problems. It's also a good idea to know your test results and keep a list of the medicines you take. Where can you learn more? Go to http://antonio-juan luis.info/.   Enter N271 in the search box to learn more about \"Learning About Diabetes Food Guidelines. \"  Current as of: March 13, 2017  Content Version: 11.3  © 9031-2366 [employer]. Care instructions adapted under license by [employer] (which disclaims liability or warranty for this information). If you have questions about a medical condition or this instruction, always ask your healthcare professional. [place] [address] any warranty or liability for your use of this information.

## 2017-09-05 NOTE — PROGRESS NOTES
Pls report improved A1C but still high and needed another 10 U increase in his long acting insulin daily. Pls make sure patient sees Endo which he keeps claiming no one ever calls him to schedule appointment with. Other labs pending still.

## 2017-09-05 NOTE — MR AVS SNAPSHOT
Visit Information Date & Time Provider Department Dept. Phone Encounter #  
 9/5/2017  7:45 AM Wil Fam MD [facility] [place] [phone] [id number] Follow-up Instructions Return if symptoms worsen or fail to improve. Upcoming Health Maintenance Date Due  
 EYE EXAM RETINAL OR DILATED Q1 2/3/1968 Pneumococcal 19-64 Medium Risk (1 of 1 - PPSV23) 2/3/1977 DTaP/Tdap/Td series (1 - Tdap) 2/3/1979 FOOT EXAM Q1 7/1/2016 MICROALBUMIN Q1 7/6/2017 INFLUENZA AGE 9 TO ADULT 8/1/2017 HEMOGLOBIN A1C Q6M 9/11/2017 LIPID PANEL Q1 3/11/2018 COLONOSCOPY 3/6/2027 Allergies as of 9/5/2017  Review Complete On: 3/10/2017 By: Fartun Cates LPN No Known Allergies Current Immunizations  Never Reviewed No immunizations on file. Not reviewed this visit You Were Diagnosed With   
  
 Codes Comments Weight loss, unintentional    -  Primary ICD-10-CM: R63.4 ICD-9-CM: 783.21 Hyperlipidemia, unspecified hyperlipidemia type     ICD-10-CM: E78.5 ICD-9-CM: 272.4 Vitals BP Pulse Temp Resp Height(growth percentile) Weight(growth percentile) 119/83 82 96.7 °F (35.9 °C) (Oral) 18 5' 10\" (1.778 m) 154 lb 12.8 oz (70.2 kg) SpO2 BMI Smoking Status 99% 22.21 kg/m2 Never Smoker BMI and BSA Data Body Mass Index Body Surface Area  
 22.21 kg/m 2 1.86 m 2 Preferred Pharmacy Pharmacy Name Phone [facility] [address] [phone] Your Updated Medication List  
  
   
This list is accurate as of: 9/5/17  7:50 AM.  Always use your most recent med list.  
  
  
  
  
 atorvastatin 20 mg tablet Commonly known as:  LIPITOR Take 1 Tab by mouth daily. This replaces previously ordered Crestor. Indications: hyperlipidemia  
  
 glucose blood VI test strips strip Commonly known as:  Beto Donato Pt to test 1x a day. insulin aspart 100 unit/mL Inpn Commonly known as:  Tmoas Sas Use 10 units with each meal  
  
 insulin glargine 100 unit/mL (3 mL) Inpn Commonly known as:  Clerance Landsman Inject 38 U SC  Indications: Diabetes Mellitus  
  
 insulin lispro 100 unit/mL kwikpen Commonly known as:  HUMALOG Inject 6U SC before each meal  
  
 Insulin Needles (Disposable) 32 gauge x 5/32\" Ndle Commonly known as:  Chloé Pen Needle Use 4 times daily with insulin  
  
 lisinopril 20 mg tablet Commonly known as:  Juan Boehringer Take 1 Tab by mouth daily. Prescriptions Sent to Pharmacy Refills  
 lisinopril (PRINIVIL, ZESTRIL) 20 mg tablet 1 Sig: Take 1 Tab by mouth daily. Class: Normal  
 Pharmacy: [address] Ph #: [phone] Route: Oral  
 insulin glargine (LANTUS,BASAGLAR) 100 unit/mL (3 mL) inpn 5 Sig: Inject 38 U SC  Indications: Diabetes Mellitus Class: Normal  
 Pharmacy: [address] Ph #: [phone]  
 insulin aspart (NOVOLOG FLEXPEN) 100 unit/mL inpn 10 Sig: Use 10 units with each meal  
 Class: Normal  
 Pharmacy: [address] Ph #: [phone] Insulin Needles, Disposable, (CHLOÉ PEN NEEDLE) 32 gauge x 5/32\" ndle 0 Sig: Use 4 times daily with insulin Class: Normal  
 Pharmacy: [address] Ph #: [phone]  
 atorvastatin (LIPITOR) 20 mg tablet 1 Sig: Take 1 Tab by mouth daily. This replaces previously ordered Crestor. Indications: hyperlipidemia Class: Normal  
 Pharmacy: [address] Ph #: [phone] Route: Oral  
  
Follow-up Instructions Return if symptoms worsen or fail to improve. Patient Instructions High Cholesterol: Care Instructions Your Care Instructions Cholesterol is a type of fat in your blood. It is needed for many body functions, such as making new cells. Cholesterol is made by your body. It also comes from food you eat. High cholesterol means that you have too much of the fat in your blood. This raises your risk of a heart attack and stroke. LDL and HDL are part of your total cholesterol. LDL is the \"bad\" cholesterol. High LDL can raise your risk for heart disease, heart attack, and stroke. HDL is the \"good\" cholesterol. It helps clear bad cholesterol from the body. High HDL is linked with a lower risk of heart disease, heart attack, and stroke. Your cholesterol levels help your doctor find out your risk for having a heart attack or stroke. You and your doctor can talk about whether you need to lower your risk and what treatment is best for you. A heart-healthy lifestyle along with medicines can help lower your cholesterol and your risk. The way you choose to lower your risk will depend on how high your risk is for heart attack and stroke. It will also depend on how you feel about taking medicines. Follow-up care is a key part of your treatment and safety. Be sure to make and go to all appointments, and call your doctor if you are having problems. It's also a good idea to know your test results and keep a list of the medicines you take. How can you care for yourself at home? · Eat a variety of foods every day. Good choices include fruits, vegetables, whole grains (like oatmeal), dried beans and peas, nuts and seeds, soy products (like tofu), and fat-free or low-fat dairy products. · Replace butter, margarine, and hydrogenated or partially hydrogenated oils with olive and canola oils. (Canola oil margarine without trans fat is fine.) · Replace red meat with fish, poultry, and soy protein (like tofu). · Limit processed and packaged foods like chips, crackers, and cookies. · Bake, broil, or steam foods. Don't che them. · Be physically active. Get at least 30 minutes of exercise on most days of the week. Walking is a good choice. You also may want to do other activities, such as running, swimming, cycling, or playing tennis or team sports. · Stay at a healthy weight or lose weight by making the changes in eating and physical activity listed above. Losing just a small amount of weight, even 5 to 10 pounds, can reduce your risk for having a heart attack or stroke. · Do not smoke. When should you call for help? Watch closely for changes in your health, and be sure to contact your doctor if: 
· You need help making lifestyle changes. · You have questions about your medicine. Where can you learn more? Go to http://antonioVIS Researchjuan luis.info/. Enter N303 in the search box to learn more about \"High Cholesterol: Care Instructions. \" Current as of: April 3, 2017 Content Version: 11.3 © 1622-2471 [employer]. Care instructions adapted under license by [employer] (which disclaims liability or warranty for this information). If you have questions about a medical condition or this instruction, always ask your healthcare professional. [address] any warranty or liability for your use of this information. Learning About Diabetes Food Guidelines Your Care Instructions Meal planning is important to manage diabetes. It helps keep your blood sugar at a target level (which you set with your doctor). You don't have to eat special foods. You can eat what your family eats, including sweets once in a while. But you do have to pay attention to how often you eat and how much you eat of certain foods. You may want to work with a dietitian or a certified diabetes educator (CDE) to help you plan meals and snacks. A dietitian or CDE can also help you lose weight if that is one of your goals. What should you know about eating carbs? Managing the amount of carbohydrate (carbs) you eat is an important part of healthy meals when you have diabetes. Carbohydrate is found in many foods. · Learn which foods have carbs. And learn the amounts of carbs in different foods. ¨ Bread, cereal, pasta, and rice have about 15 grams of carbs in a serving. A serving is 1 slice of bread (1 ounce), ½ cup of cooked cereal, or 1/3 cup of cooked pasta or rice. ¨ Fruits have 15 grams of carbs in a serving. A serving is 1 small fresh fruit, such as an apple or orange; ½ of a banana; ½ cup of cooked or canned fruit; ½ cup of fruit juice; 1 cup of melon or raspberries; or 2 tablespoons of dried fruit. ¨ Milk and no-sugar-added yogurt have 15 grams of carbs in a serving. A serving is 1 cup of milk or 2/3 cup of no-sugar-added yogurt. ¨ Starchy vegetables have 15 grams of carbs in a serving. A serving is ½ cup of mashed potatoes or sweet potato; 1 cup winter squash; ½ of a small baked potato; ½ cup of cooked beans; or ½ cup cooked corn or green peas. · Learn how much carbs to eat each day and at each meal. A dietitian or CDE can teach you how to keep track of the amount of carbs you eat. This is called carbohydrate counting. · If you are not sure how to count carbohydrate grams, use the Plate Method to plan meals. It is a good, quick way to make sure that you have a balanced meal. It also helps you spread carbs throughout the day. ¨ Divide your plate by types of foods. Put non-starchy vegetables on half the plate, meat or other protein food on one-quarter of the plate, and a grain or starchy vegetable in the final quarter of the plate. To this you can add a small piece of fruit and 1 cup of milk or yogurt, depending on how many carbs you are supposed to eat at a meal. 
· Try to eat about the same amount of carbs at each meal. Do not \"save up\" your daily allowance of carbs to eat at one meal. 
· Proteins have very little or no carbs per serving.  Examples of proteins are beef, chicken, turkey, fish, eggs, tofu, cheese, cottage cheese, and peanut butter. A serving size of meat is 3 ounces, which is about the size of a deck of cards. Examples of meat substitute serving sizes (equal to 1 ounce of meat) are 1/4 cup of cottage cheese, 1 egg, 1 tablespoon of peanut butter, and ½ cup of tofu. How can you eat out and still eat healthy? · Learn to estimate the serving sizes of foods that have carbohydrate. If you measure food at home, it will be easier to estimate the amount in a serving of restaurant food. · If the meal you order has too much carbohydrate (such as potatoes, corn, or baked beans), ask to have a low-carbohydrate food instead. Ask for a salad or green vegetables. · If you use insulin, check your blood sugar before and after eating out to help you plan how much to eat in the future. · If you eat more carbohydrate at a meal than you had planned, take a walk or do other exercise. This will help lower your blood sugar. What else should you know? · Limit saturated fat, such as the fat from meat and dairy products. This is a healthy choice because people who have diabetes are at higher risk of heart disease. So choose lean cuts of meat and nonfat or low-fat dairy products. Use olive or canola oil instead of butter or shortening when cooking. · Don't skip meals. Your blood sugar may drop too low if you skip meals and take insulin or certain medicines for diabetes. · Check with your doctor before you drink alcohol. Alcohol can cause your blood sugar to drop too low. Alcohol can also cause a bad reaction if you take certain diabetes medicines. Follow-up care is a key part of your treatment and safety. Be sure to make and go to all appointments, and call your doctor if you are having problems. It's also a good idea to know your test results and keep a list of the medicines you take. Where can you learn more? Go to http://antonio-juan luis.info/. Enter J612 in the search box to learn more about \"Learning About Diabetes Food Guidelines. \" Current as of: March 13, 2017 Content Version: 11.3 © 4480-5977 [employer]. Care instructions adapted under license by [employer] (which disclaims liability or warranty for this information). If you have questions about a medical condition or this instruction, always ask your healthcare professional. [address] any warranty or liability for your use of this information. Introducing [facility]! [facility] introduces WebTV patient portal. Now you can access parts of your medical record, email your doctor's office, and request medication refills online. 1. In your internet browser, go to [email or web address] 2. Click on the First Time User? Click Here link in the Sign In box. You will see the New Member Sign Up page. 3. Enter your WebTV Access Code exactly as it appears below. You will not need to use this code after youve completed the sign-up process. If you do not sign up before the expiration date, you must request a new code. · WebTV Access Code: [email or web address] Expires: 12/4/2017  7:50 AM 
 
4. Enter the last four digits of your Social Security Number (xxxx) and Date of Birth (mm/dd/yyyy) as indicated and click Submit. You will be taken to the next sign-up page. 5. Create a WebTV ID. This will be your WebTV login ID and cannot be changed, so think of one that is secure and easy to remember. 6. Create a WebTV password. You can change your password at any time. 7. Enter your Password Reset Question and Answer. This can be used at a later time if you forget your password. 8. Enter your e-mail address. You will receive e-mail notification when new information is available in [address] 9. Click Sign Up. You can now view and download portions of your medical record. 10. Click the Download Summary menu link to download a portable copy of your medical information. If you have questions, please visit the Frequently Asked Questions section of the [employer] website. Remember, [employer] is NOT to be used for urgent needs. For medical emergencies, dial 911. Now available from your iPhone and Android! Please provide this summary of care documentation to your next provider. Your primary care clinician is listed as Clemente Talley. If you have any questions after today's visit, please call [phone].

## 2017-09-06 ENCOUNTER — TELEPHONE (OUTPATIENT)
Dept: FAMILY MEDICINE CLINIC | Age: 57
End: 2017-09-06

## 2017-09-06 NOTE — TELEPHONE ENCOUNTER
Patient has been called and notified of results. Patient gave verbal understanding. Closing encounter.

## 2017-09-06 NOTE — TELEPHONE ENCOUNTER
----- Message from Rocío Malave MD sent at 9/5/2017  4:30 PM EDT -----  Pls report improved A1C but still high and needed another 10 U increase in his long acting insulin daily. Pls make sure patient sees Endo which he keeps claiming no one ever calls him to schedule appointment with. Other labs pending still.

## 2018-02-09 ENCOUNTER — OFFICE VISIT (OUTPATIENT)
Dept: FAMILY MEDICINE CLINIC | Age: 58
End: 2018-02-09

## 2018-02-09 VITALS
HEIGHT: 70 IN | DIASTOLIC BLOOD PRESSURE: 88 MMHG | HEART RATE: 75 BPM | RESPIRATION RATE: 16 BRPM | WEIGHT: 161.2 LBS | BODY MASS INDEX: 23.08 KG/M2 | TEMPERATURE: 97.8 F | SYSTOLIC BLOOD PRESSURE: 118 MMHG | OXYGEN SATURATION: 98 %

## 2018-02-09 DIAGNOSIS — Z79.4 TYPE 2 DIABETES MELLITUS WITH HYPERGLYCEMIA, WITH LONG-TERM CURRENT USE OF INSULIN (HCC): Primary | ICD-10-CM

## 2018-02-09 DIAGNOSIS — E11.65 TYPE 2 DIABETES MELLITUS WITH HYPERGLYCEMIA, WITH LONG-TERM CURRENT USE OF INSULIN (HCC): Primary | ICD-10-CM

## 2018-02-09 RX ORDER — INSULIN GLARGINE 100 [IU]/ML
INJECTION, SOLUTION SUBCUTANEOUS
Qty: 10 PEN | Refills: 5 | Status: SHIPPED | OUTPATIENT
Start: 2018-02-09 | End: 2018-06-22 | Stop reason: SDUPTHER

## 2018-02-09 NOTE — PROGRESS NOTES
Annmarie Waddell is a 59 y.o.  male and presents with F/U for HTN, DM2 and high chol. Subjective: Additional Concerns: none     Patient Active Problem List    Diagnosis Date Noted    Need for hepatitis C screening test 09/08/2015    DM (diabetes mellitus), type 2, uncontrolled ([facility] [address] 05/01/2015    HTN (hypertension) 05/01/2015     Current Outpatient Prescriptions   Medication Sig Dispense Refill    lisinopril (PRINIVIL, ZESTRIL) 20 mg tablet Take 1 Tab by mouth daily. 90 Tab 1    insulin glargine (LANTUS,BASAGLAR) 100 unit/mL (3 mL) inpn Inject 38 U SC  Indications: Diabetes Mellitus 10 Pen 5    insulin aspart (NOVOLOG FLEXPEN) 100 unit/mL inpn Use 10 units with each meal 5 Pen 10    Insulin Needles, Disposable, (CHLOÉ PEN NEEDLE) 32 gauge x 5/32\" ndle Use 4 times daily with insulin 360 Pen Needle 0    atorvastatin (LIPITOR) 20 mg tablet Take 1 Tab by mouth daily. This replaces previously ordered Crestor. Indications: hyperlipidemia 90 Tab 1    insulin lispro (HUMALOG) 100 unit/mL kwikpen Inject 6U SC before each meal 2 Package 10    glucose blood VI test strips (ONETOUCH VERIO) strip Pt to test 1x a day.  100 Strip 6     No Known Allergies  Past Medical History:   Diagnosis Date    Diabetes ([facility]    [facility] H/O splenectomy     Hypertension      Past Surgical History:   Procedure Laterality Date    HX CHOLECYSTECTOMY       Family History   Problem Relation Age of Onset    Diabetes Mother     Hypertension Mother    [facility] Elevated Lipids Mother     Diabetes Father     Prostate Cancer Father      Social History   Substance Use Topics    Smoking status: Never Smoker    Smokeless tobacco: Never Used    Alcohol use No     ROS     General: negative for - chills, fatigue, fever, weight change  Endo: negative for - hot flashes, polydipsia/polyuria or temperature intolerance  Resp: negative for - cough, shortness of breath or wheezing  CV: negative for - chest pain, edema or palpitations  GI: negative for - abdominal pain, change in bowel habits, constipation, diarrhea or nausea/vomiting  : negative for - dysuria, hematuria, incontinence, pelvic pain or vulvar/vaginal symptoms  MSK: negative for - joint pain, joint swelling or muscle pain  Neuro: negative for - confusion, headaches, seizures or weakness    Objective:    PE    Alert, well appearing, and in no distress, oriented to person, place, and time and normal appearing weight  General appearance - alert, well appearing, and in no distress, oriented to person, place, and time and normal appearing weight  Mental status - alert, oriented to person, place, and time, normal mood, behavior, speech, dress, motor activity, and thought processes  Chest - clear to auscultation, no wheezes, rales or rhonchi, symmetric air entry  Heart - normal rate, regular rhythm, normal S1, S2, no murmurs, rubs, clicks or gallops  Extremities - peripheral pulses normal, no pedal edema, no clubbing or cyanosis    [facility] Outpatient Visit on 09/05/2017   Component Date Value Ref Range Status    LIPID PROFILE 09/05/2017        Final    Cholesterol, total 09/05/2017 177  <200 MG/DL Final    Triglyceride 09/05/2017 40  <150 MG/DL Final    Comment: The drugs N-acetylcysteine (NAC) and  Metamiszole have been found to cause falsely  low results in this chemical assay. Please  be sure to submit blood samples obtained  BEFORE administration of either of these  drugs to assure correct results.       HDL Cholesterol 09/05/2017 112* 40 - 60 MG/DL Final    LDL, calculated 09/05/2017 57  0 - 100 MG/DL Final    VLDL, calculated 09/05/2017 8  MG/DL Final    CHOL/HDL Ratio 09/05/2017 1.6  0 - 5.0   Final    Microalbumin,urine random 09/05/2017 0.90  0 - 3.0 MG/DL Final    Creatinine, urine 09/05/2017 177.60* 30 - 125 mg/dL Final    Microalbumin/Creat ratio (mg/g cre* 09/05/2017 5  0 - 30 mg/g Final    Sodium 09/05/2017 142  136 - 145 mmol/L Final    Potassium 09/05/2017 4.9  3.5 - 5.5 mmol/L Final    Chloride 09/05/2017 106  100 - 108 mmol/L Final    CO2 09/05/2017 28  21 - 32 mmol/L Final    Anion gap 09/05/2017 8  3.0 - 18 mmol/L Final    Glucose 09/05/2017 157* 74 - 99 mg/dL Final    BUN 09/05/2017 12  7.0 - 18 MG/DL Final    Creatinine 09/05/2017 0.94  0.6 - 1.3 MG/DL Final    BUN/Creatinine ratio 09/05/2017 13  12 - 20   Final    GFR est AA 09/05/2017 >60  >60 ml/min/1.73m2 Final    GFR est non-AA 09/05/2017 >60  >60 ml/min/1.73m2 Final    Comment: (NOTE)  Estimated GFR is calculated using the Modification of Diet in Renal   Disease (MDRD) Study equation, reported for both  Americans   (GFRAA) and non- Americans (GFRNA), and normalized to 1.73m2   body surface area. The physician must decide which value applies to   the patient. The MDRD study equation should only be used in   individuals age 25 or older. It has not been validated for the   following: pregnant women, patients with serious comorbid conditions,   or on certain medications, or persons with extremes of body size,   muscle mass, or nutritional status.  Calcium 09/05/2017 8.8  8.5 - 10.1 MG/DL Final    Bilirubin, total 09/05/2017 0.5  0.2 - 1.0 MG/DL Final    ALT (SGPT) 09/05/2017 19  16 - 61 U/L Final    AST (SGOT) 09/05/2017 13* 15 - 37 U/L Final    Alk.  phosphatase 09/05/2017 66  45 - 117 U/L Final    Protein, total 09/05/2017 7.2  6.4 - 8.2 g/dL Final    Albumin 09/05/2017 3.8  3.4 - 5.0 g/dL Final    Globulin 09/05/2017 3.4  2.0 - 4.0 g/dL Final    A-G Ratio 09/05/2017 1.1  0.8 - 1.7   Final    Hemoglobin A1c 09/05/2017 13.8* 4.2 - 5.6 % Final    Comment: (NOTE)  HbA1C Interpretive Ranges  <5.7              Normal  5.7 - 6.4         Consider Prediabetes  >6.5              Consider Diabetes      Est. average glucose 09/05/2017 349  mg/dL Final    Comment: (NOTE)  The eAG should be interpreted with patient characteristics in mind   since ethnicity, interindividual differences, red cell lifespan,   variation in rates of glycation, etc. may affect the validity of the   calculation.  Prostate Specific Ag 09/05/2017 3.7  0.0 - 4.0 ng/mL Final    New method in use, please reestablish patient baseline    WBC 09/05/2017 4.1* 4.6 - 13.2 K/uL Final    RBC 09/05/2017 5.41  4.70 - 5.50 M/uL Final    HGB 09/05/2017 15.7  13.0 - 16.0 g/dL Final    HCT 09/05/2017 45.8  36.0 - 48.0 % Final    MCV 09/05/2017 84.7  74.0 - 97.0 FL Final    MCH 09/05/2017 29.0  24.0 - 34.0 PG Final    MCHC 09/05/2017 34.3  31.0 - 37.0 g/dL Final    RDW 09/05/2017 13.2  11.6 - 14.5 % Final    PLATELET 77/27/6172 673  135 - 420 K/uL Final    MPV 09/05/2017 10.6  9.2 - 11.8 FL Final    NEUTROPHILS 09/05/2017 33* 40 - 73 % Final    LYMPHOCYTES 09/05/2017 56* 21 - 52 % Final    MONOCYTES 09/05/2017 9  3 - 10 % Final    EOSINOPHILS 09/05/2017 2  0 - 5 % Final    BASOPHILS 09/05/2017 0  0 - 2 % Final    ABS. NEUTROPHILS 09/05/2017 1.4* 1.8 - 8.0 K/UL Final    ABS. LYMPHOCYTES 09/05/2017 2.3  0.9 - 3.6 K/UL Final    ABS. MONOCYTES 09/05/2017 0.4  0.05 - 1.2 K/UL Final    ABS. EOSINOPHILS 09/05/2017 0.1  0.0 - 0.4 K/UL Final    ABS.  BASOPHILS 09/05/2017 0.0  0.0 - 0.06 K/UL Final    DF 09/05/2017 AUTOMATED    Final    TSH 09/05/2017 1.62  0.36 - 3.74 uIU/mL Final       TESTS  Results for orders placed or performed during the hospital encounter of 09/05/17   LIPID PANEL   Result Value Ref Range    LIPID PROFILE          Cholesterol, total 177 <200 MG/DL    Triglyceride 40 <150 MG/DL    HDL Cholesterol 112 (H) 40 - 60 MG/DL    LDL, calculated 57 0 - 100 MG/DL    VLDL, calculated 8 MG/DL    CHOL/HDL Ratio 1.6 0 - 5.0     MICROALBUMIN, UR, RAND W/ MICROALBUMIN/CREA RATIO   Result Value Ref Range    Microalbumin,urine random 0.90 0 - 3.0 MG/DL    Creatinine, urine 177.60 (H) 30 - 125 mg/dL    Microalbumin/Creat ratio (mg/g creat) 5 0 - 30 mg/g   METABOLIC PANEL, COMPREHENSIVE Result Value Ref Range    Sodium 142 136 - 145 mmol/L    Potassium 4.9 3.5 - 5.5 mmol/L    Chloride 106 100 - 108 mmol/L    CO2 28 21 - 32 mmol/L    Anion gap 8 3.0 - 18 mmol/L    Glucose 157 (H) 74 - 99 mg/dL    BUN 12 7.0 - 18 MG/DL    Creatinine 0.94 0.6 - 1.3 MG/DL    BUN/Creatinine ratio 13 12 - 20      GFR est AA >60 >60 ml/min/1.73m2    GFR est non-AA >60 >60 ml/min/1.73m2    Calcium 8.8 8.5 - 10.1 MG/DL    Bilirubin, total 0.5 0.2 - 1.0 MG/DL    ALT (SGPT) 19 16 - 61 U/L    AST (SGOT) 13 (L) 15 - 37 U/L    Alk. phosphatase 66 45 - 117 U/L    Protein, total 7.2 6.4 - 8.2 g/dL    Albumin 3.8 3.4 - 5.0 g/dL    Globulin 3.4 2.0 - 4.0 g/dL    A-G Ratio 1.1 0.8 - 1.7     HEMOGLOBIN A1C WITH EAG   Result Value Ref Range    Hemoglobin A1c 13.8 (H) 4.2 - 5.6 %    Est. average glucose 349 mg/dL   PSA SCREENING (SCREENING)   Result Value Ref Range    Prostate Specific Ag 3.7 0.0 - 4.0 ng/mL   CBC WITH AUTOMATED DIFF   Result Value Ref Range    WBC 4.1 (L) 4.6 - 13.2 K/uL    RBC 5.41 4.70 - 5.50 M/uL    HGB 15.7 13.0 - 16.0 g/dL    HCT 45.8 36.0 - 48.0 %    MCV 84.7 74.0 - 97.0 FL    MCH 29.0 24.0 - 34.0 PG    MCHC 34.3 31.0 - 37.0 g/dL    RDW 13.2 11.6 - 14.5 %    PLATELET 352 160 - 141 K/uL    MPV 10.6 9.2 - 11.8 FL    NEUTROPHILS 33 (L) 40 - 73 %    LYMPHOCYTES 56 (H) 21 - 52 %    MONOCYTES 9 3 - 10 %    EOSINOPHILS 2 0 - 5 %    BASOPHILS 0 0 - 2 %    ABS. NEUTROPHILS 1.4 (L) 1.8 - 8.0 K/UL    ABS. LYMPHOCYTES 2.3 0.9 - 3.6 K/UL    ABS. MONOCYTES 0.4 0.05 - 1.2 K/UL    ABS. EOSINOPHILS 0.1 0.0 - 0.4 K/UL    ABS. BASOPHILS 0.0 0.0 - 0.06 K/UL    DF AUTOMATED     TSH 3RD GENERATION   Result Value Ref Range    TSH 1.62 0.36 - 3.74 uIU/mL     Assessment/Plan:      1. DM2 uncontrolled - A1C. Patient to check up front for referral to Endo. Lantus refilled     2. HTN controlled - Continue current regimen    3. High chol - FLP, CMP    Lab review: orders written for new lab studies as appropriate; see orders.  We will call for results and further plan. I have discussed the diagnosis with the patient and the intended plan as seen in the above orders. The patient has received an after-visit summary and questions were answered concerning future plans. I have discussed medication side effects and warnings with the patient as well. I have reviewed the plan of care with the patient, accepted their input and they are in agreement with the treatment goals. F/U in 3 months routine.        Gwendolyn Santiago MD

## 2018-02-09 NOTE — PROGRESS NOTES
Tramaine Madrid is a 59 y.o. male presents to office for dm      1.  Have you been to the ER, urgent care clinic or hospitalized since your last visit? no          Health Maintenance items with a due date reviewed with patient:  Health Maintenance Due   Topic Date Due    MenB Meningococcal topic (1 of 2 - Bexsero 2-Dose Series) 02/03/1968    EYE EXAM RETINAL OR DILATED Q1  02/03/1968    Pneumococcal 19-64 Medium Risk (1 of 1 - PPSV23) 02/03/1977    DTaP/Tdap/Td series (1 - Tdap) 02/03/1979    FOOT EXAM Q1  07/01/2016    Influenza Age 9 to Adult  08/01/2017    ZOSTER VACCINE AGE 60>  12/03/2017    HEMOGLOBIN A1C Q6M  03/05/2018

## 2018-02-10 NOTE — PATIENT INSTRUCTIONS

## 2018-02-12 LAB
ALBUMIN SERPL-MCNC: 4 G/DL (ref 3.6–4.8)
ALBUMIN/GLOB SERPL: 1.4 {RATIO} (ref 1.2–2.2)
ALP SERPL-CCNC: 74 IU/L (ref 39–117)
ALT SERPL-CCNC: 13 IU/L (ref 0–44)
AST SERPL-CCNC: 13 IU/L (ref 0–40)
BILIRUB SERPL-MCNC: 0.5 MG/DL (ref 0–1.2)
BUN SERPL-MCNC: 11 MG/DL (ref 8–27)
BUN/CREAT SERPL: 12 (ref 10–24)
CALCIUM SERPL-MCNC: 9.1 MG/DL (ref 8.6–10.2)
CHLORIDE SERPL-SCNC: 100 MMOL/L (ref 96–106)
CHOLEST SERPL-MCNC: 178 MG/DL (ref 100–199)
CO2 SERPL-SCNC: 26 MMOL/L (ref 18–29)
CREAT SERPL-MCNC: 0.93 MG/DL (ref 0.76–1.27)
EST. AVERAGE GLUCOSE BLD GHB EST-MCNC: >398 MG/DL
GFR SERPLBLD CREATININE-BSD FMLA CKD-EPI: 103 ML/MIN/1.73
GFR SERPLBLD CREATININE-BSD FMLA CKD-EPI: 89 ML/MIN/1.73
GLOBULIN SER CALC-MCNC: 2.9 G/DL (ref 1.5–4.5)
GLUCOSE SERPL-MCNC: 110 MG/DL (ref 65–99)
HBA1C MFR BLD: >15.5 % (ref 4.8–5.6)
HDLC SERPL-MCNC: 94 MG/DL
INTERPRETATION, 910389: NORMAL
LDLC SERPL CALC-MCNC: 77 MG/DL (ref 0–99)
Lab: NORMAL
POTASSIUM SERPL-SCNC: 4.2 MMOL/L (ref 3.5–5.2)
PROT SERPL-MCNC: 6.9 G/DL (ref 6–8.5)
SODIUM SERPL-SCNC: 140 MMOL/L (ref 134–144)
TRIGL SERPL-MCNC: 37 MG/DL (ref 0–149)
VLDLC SERPL CALC-MCNC: 7 MG/DL (ref 5–40)

## 2018-02-14 NOTE — PROGRESS NOTES
Pls male sure patient sees Endo ASAP. His A1C continues to increase to highest sea ever seen despite insulin increases. He knows this already but pls expedite and if  I have to do stat referral pls let me know.

## 2018-02-15 ENCOUNTER — TELEPHONE (OUTPATIENT)
Dept: FAMILY MEDICINE CLINIC | Age: 58
End: 2018-02-15

## 2018-02-15 DIAGNOSIS — Z79.4 TYPE 2 DIABETES MELLITUS WITH HYPERGLYCEMIA, WITH LONG-TERM CURRENT USE OF INSULIN (HCC): Primary | ICD-10-CM

## 2018-02-15 DIAGNOSIS — E11.65 TYPE 2 DIABETES MELLITUS WITH HYPERGLYCEMIA, WITH LONG-TERM CURRENT USE OF INSULIN (HCC): Primary | ICD-10-CM

## 2018-02-15 NOTE — TELEPHONE ENCOUNTER
[facility]  [facility] and 9 am, check in at 8:40  Fax: [phone]    Please sign referral.

## 2018-02-15 NOTE — TELEPHONE ENCOUNTER
----- Message from Galen Salcido MD sent at 2/14/2018  8:00 AM EST -----  Pls male sure patient sees Endo ASAP. His A1C continues to increase to highest sea ever seen despite insulin increases. He knows this already but pls expedite and if  I have to do stat referral pls let me know.

## 2018-06-22 ENCOUNTER — OFFICE VISIT (OUTPATIENT)
Dept: FAMILY MEDICINE CLINIC | Age: 58
End: 2018-06-22

## 2018-06-22 VITALS
HEIGHT: 70 IN | TEMPERATURE: 96.3 F | DIASTOLIC BLOOD PRESSURE: 85 MMHG | WEIGHT: 152 LBS | OXYGEN SATURATION: 99 % | RESPIRATION RATE: 17 BRPM | BODY MASS INDEX: 21.76 KG/M2 | HEART RATE: 91 BPM | SYSTOLIC BLOOD PRESSURE: 128 MMHG

## 2018-06-22 DIAGNOSIS — E11.65 TYPE 2 DIABETES MELLITUS WITH HYPERGLYCEMIA, WITH LONG-TERM CURRENT USE OF INSULIN (HCC): Primary | ICD-10-CM

## 2018-06-22 DIAGNOSIS — E78.5 HYPERLIPIDEMIA, UNSPECIFIED HYPERLIPIDEMIA TYPE: ICD-10-CM

## 2018-06-22 DIAGNOSIS — Z79.4 TYPE 2 DIABETES MELLITUS WITH HYPERGLYCEMIA, WITH LONG-TERM CURRENT USE OF INSULIN (HCC): Primary | ICD-10-CM

## 2018-06-22 DIAGNOSIS — I10 ESSENTIAL HYPERTENSION: ICD-10-CM

## 2018-06-22 RX ORDER — ATORVASTATIN CALCIUM 20 MG/1
20 TABLET, FILM COATED ORAL DAILY
Qty: 90 TAB | Refills: 1 | Status: SHIPPED | OUTPATIENT
Start: 2018-06-22

## 2018-06-22 RX ORDER — PEN NEEDLE, DIABETIC 31 GX3/16"
NEEDLE, DISPOSABLE MISCELLANEOUS
Qty: 360 PEN NEEDLE | Refills: 0 | Status: SHIPPED | OUTPATIENT
Start: 2018-06-22

## 2018-06-22 RX ORDER — INSULIN GLARGINE 100 [IU]/ML
INJECTION, SOLUTION SUBCUTANEOUS
Qty: 10 PEN | Refills: 5 | Status: SHIPPED | OUTPATIENT
Start: 2018-06-22

## 2018-06-22 RX ORDER — LISINOPRIL 20 MG/1
20 TABLET ORAL DAILY
Qty: 90 TAB | Refills: 1 | Status: SHIPPED | OUTPATIENT
Start: 2018-06-22

## 2018-06-22 RX ORDER — INSULIN ASPART 100 [IU]/ML
INJECTION, SOLUTION INTRAVENOUS; SUBCUTANEOUS
Qty: 5 PEN | Refills: 10 | Status: SHIPPED | OUTPATIENT
Start: 2018-06-22

## 2018-06-22 NOTE — PATIENT INSTRUCTIONS

## 2018-06-22 NOTE — PROGRESS NOTES
Deena Dye is a 59 y.o.  male and presents with uncontrolled DM with controlled HTN and chol. He recentl lost some weight but is constantly traveling between here and [place]. He has not has opportunity to see Endo apparently  Due to his travel schedule. He prefers to stay here in this office for his medical needs. He sees his eye doctor yearly. Subjective: Additional Concerns: none    Patient Active Problem List    Diagnosis Date Noted    Need for hepatitis C screening test 09/08/2015    DM (diabetes mellitus), type 2, uncontrolled ([facility] [address] 05/01/2015    HTN (hypertension) 05/01/2015     Current Outpatient Prescriptions   Medication Sig Dispense Refill    insulin glargine (LANTUS,BASAGLAR) 100 unit/mL (3 mL) inpn Inject 38 U SC  Indications: Diabetes Mellitus 10 Pen 5    lisinopril (PRINIVIL, ZESTRIL) 20 mg tablet Take 1 Tab by mouth daily. 90 Tab 1    insulin aspart (NOVOLOG FLEXPEN) 100 unit/mL inpn Use 10 units with each meal 5 Pen 10    Insulin Needles, Disposable, (CHLOÉ PEN NEEDLE) 32 gauge x 5/32\" ndle Use 4 times daily with insulin 360 Pen Needle 0    atorvastatin (LIPITOR) 20 mg tablet Take 1 Tab by mouth daily. This replaces previously ordered Crestor. Indications: hyperlipidemia 90 Tab 1    glucose blood VI test strips (ONETOUCH VERIO) strip Pt to test 1x a day.  100 Strip 6    insulin lispro (HUMALOG) 100 unit/mL kwikpen Inject 6U SC before each meal 2 Package 10     No Known Allergies  Past Medical History:   Diagnosis Date    [facility]    [facility] H[facility] splenectomy     Hypertension      Past Surgical History:   Procedure Laterality Date    HX CHOLECYSTECTOMY       Family History   Problem Relation Age of Onset    Diabetes Mother     Hypertension Mother    [facility] Elevated Lipids Mother     Diabetes Father     Prostate Cancer Father      Social History   Substance Use Topics    Smoking status: Never Smoker    Smokeless tobacco: Never Used    Alcohol use No     ROS General: negative for - chills, fatigue, fever, weight change  Endo: negative for - hot flashes, polydipsia/polyuria or temperature intolerance  Resp: negative for - cough, shortness of breath or wheezing  CV: negative for - chest pain, edema or palpitations  Neuro: negative for - confusion, headaches, seizures or weakness  Derm: negative for - dry skin, hair changes, rash or skin lesion changes    Objective:    PE    alert, well appearing, and in no distress, oriented to person, place, and time and normal appearing weight  General appearance - alert, well appearing, and in no distress, oriented to person, place, and time and normal appearing weight  Mental status - alert, oriented to person, place, and time, normal mood, behavior, speech, dress, motor activity, and thought processes  Chest - clear to auscultation, no wheezes, rales or rhonchi, symmetric air entry  Heart - normal rate, regular rhythm, normal S1, S2, no murmurs, rubs, clicks or gallops  Extremities - peripheral pulses normal, no pedal edema, no clubbing or cyanosis    LABS   Office Visit on 02/09/2018   Component Date Value Ref Range Status    Glucose 02/10/2018 110* 65 - 99 mg/dL Final    BUN 02/10/2018 11  8 - 27 mg/dL Final    Creatinine 02/10/2018 0.93  0.76 - 1.27 mg/dL Final    GFR est non-AA 02/10/2018 89  >59 mL/min/1.73 Final    GFR est AA 02/10/2018 103  >59 mL/min/1.73 Final    BUN/Creatinine ratio 02/10/2018 12  10 - 24 Final    Sodium 02/10/2018 140  134 - 144 mmol/L Final    Potassium 02/10/2018 4.2  3.5 - 5.2 mmol/L Final    Chloride 02/10/2018 100  96 - 106 mmol/L Final    CO2 02/10/2018 26  18 - 29 mmol/L Final    Calcium 02/10/2018 9.1  8.6 - 10.2 mg/dL Final    Protein, total 02/10/2018 6.9  6.0 - 8.5 g/dL Final    Albumin 02/10/2018 4.0  3.6 - 4.8 g/dL Final    GLOBULIN, TOTAL 02/10/2018 2.9  1.5 - 4.5 g/dL Final    A-G Ratio 02/10/2018 1.4  1.2 - 2.2 Final    Bilirubin, total 02/10/2018 0.5  0.0 - 1.2 mg/dL Final    Alk. phosphatase 02/10/2018 74  39 - 117 IU/L Final    AST (SGOT) 02/10/2018 13  0 - 40 IU/L Final    ALT (SGPT) 02/10/2018 13  0 - 44 IU/L Final    Cholesterol, total 02/10/2018 178  100 - 199 mg/dL Final    Triglyceride 02/10/2018 37  0 - 149 mg/dL Final    HDL Cholesterol 02/10/2018 94  >39 mg/dL Final    VLDL, calculated 02/10/2018 7  5 - 40 mg/dL Final    LDL, calculated 02/10/2018 77  0 - 99 mg/dL Final    INTERPRETATION 02/10/2018 Note   Final    Comment: Medical Director's Note: Patient First Name has been  corrected on 2/12/2018, was Chritsopher and now is Alonso Baker. Please review this report in its entirety, since changes to  patient demographics may affect result interpretation(s)  and/or treatment/follow-up suggestions. Supplemental report is available.  Hemoglobin A1c 02/10/2018 >15.5* 4.8 - 5.6 % Final    Comment: **Verified by repeat analysis**           Pre-diabetes: 5.7 - 6.4           Diabetes: >6.4           Glycemic control for adults with diabetes: <7.0      Estimated average glucose 02/10/2018 >398  mg/dL Final    PDF Image 96/[id number] Not applicable   Final       TESTS  Results for orders placed or performed in visit on 61/06/45   METABOLIC PANEL, COMPREHENSIVE   Result Value Ref Range    Glucose 110 (H) 65 - 99 mg/dL    BUN 11 8 - 27 mg/dL    Creatinine 0.93 0.76 - 1.27 mg/dL    GFR est non-AA 89 >59 mL/min/1.73    GFR est  >59 mL/min/1.73    BUN/Creatinine ratio 12 10 - 24    Sodium 140 134 - 144 mmol/L    Potassium 4.2 3.5 - 5.2 mmol/L    Chloride 100 96 - 106 mmol/L    CO2 26 18 - 29 mmol/L    Calcium 9.1 8.6 - 10.2 mg/dL    Protein, total 6.9 6.0 - 8.5 g/dL    Albumin 4.0 3.6 - 4.8 g/dL    GLOBULIN, TOTAL 2.9 1.5 - 4.5 g/dL    A-G Ratio 1.4 1.2 - 2.2    Bilirubin, total 0.5 0.0 - 1.2 mg/dL    Alk.  phosphatase 74 39 - 117 IU/L    AST (SGOT) 13 0 - 40 IU/L    ALT (SGPT) 13 0 - 44 IU/L   LIPID PANEL   Result Value Ref Range    Cholesterol, total 178 100 - 199 mg/dL    Triglyceride 37 0 - 149 mg/dL    HDL Cholesterol 94 >39 mg/dL    VLDL, calculated 7 5 - 40 mg/dL    LDL, calculated 77 0 - 99 mg/dL   CVD REPORT   Result Value Ref Range    INTERPRETATION Note    HEMOGLOBIN A1C WITH EAG   Result Value Ref Range    Hemoglobin A1c >15.5 (H) 4.8 - 5.6 %    Estimated average glucose >398 mg/dL   DIABETES PATIENT EDUCATION   Result Value Ref Range    PDF Image Not applicable      Assessment/Plan: 1. Type 2 diabetes mellitus with hyperglycemia uncontrolled, with long-term current use of insulin (HCC) - F/U with Dr. Andrew Quispe as a DM consult  - LIPID PANEL; Future  - METABOLIC PANEL, COMPREHENSIVE; Future  - HEMOGLOBIN A1C WITH EAG; Future  AGUSTINA ab and fructosamine    2. HTN controlled - continue current regimen and monitoring. 3. High chol - controlled - continue current regimen and monitoring. Lab review: orders written for new lab studies as appropriate; see orders. I have discussed the diagnosis with the patient and the intended plan as seen in the above orders. The patient has received an after-visit summary and questions were answered concerning future plans. I have discussed medication side effects and warnings with the patient as well. I have reviewed the plan of care with the patient, accepted their input and they are in agreement with the treatment goals. Follow-up Disposition:  Return in about 3 months (around 9/22/2018).     Chas Martin MD

## 2018-06-22 NOTE — PROGRESS NOTES
Delmar Andre is a 59 y.o. male presents to office for htn and dm    Medication list has been reviewed with Delmar Andre and updated as of today's date     Health Maintenance items with a due date reviewed with patient:  Health Maintenance Due   Topic Date Due    MenB Meningococcal topic (1 of 2 - Bexsero 2-Dose Series) 02/03/1968    EYE EXAM RETINAL OR DILATED Q1  02/03/1968    Pneumococcal 19-64 Medium Risk (1 of 1 - PPSV23) 02/03/1977    DTaP/Tdap/Td series (1 - Tdap) 02/03/1979    FOOT EXAM Q1  07/01/2016    ZOSTER VACCINE AGE 60>  12/03/2017

## 2018-07-09 LAB
ALBUMIN SERPL-MCNC: 4.5 G/DL (ref 3.6–4.8)
ALBUMIN/GLOB SERPL: 1.5 {RATIO} (ref 1.2–2.2)
ALP SERPL-CCNC: 81 IU/L (ref 39–117)
ALT SERPL-CCNC: 12 IU/L (ref 0–44)
AST SERPL-CCNC: 15 IU/L (ref 0–40)
BILIRUB SERPL-MCNC: 0.4 MG/DL (ref 0–1.2)
BUN SERPL-MCNC: 19 MG/DL (ref 8–27)
BUN/CREAT SERPL: 17 (ref 10–24)
CALCIUM SERPL-MCNC: 9.6 MG/DL (ref 8.6–10.2)
CHLORIDE SERPL-SCNC: 100 MMOL/L (ref 96–106)
CHOLEST SERPL-MCNC: 201 MG/DL (ref 100–199)
CO2 SERPL-SCNC: 22 MMOL/L (ref 20–29)
CREAT SERPL-MCNC: 1.09 MG/DL (ref 0.76–1.27)
EST. AVERAGE GLUCOSE BLD GHB EST-MCNC: >398 MG/DL
FRUCTOSAMINE SERPL-SCNC: 928 UMOL/L (ref 0–285)
GAD65 AB SER IA-ACNC: <5 U/ML (ref 0–5)
GLOBULIN SER CALC-MCNC: 3.1 G/DL (ref 1.5–4.5)
GLUCOSE SERPL-MCNC: 388 MG/DL (ref 65–99)
HBA1C MFR BLD: >15.5 % (ref 4.8–5.6)
HDLC SERPL-MCNC: 92 MG/DL
INTERPRETATION, 910389: NORMAL
LDLC SERPL CALC-MCNC: 93 MG/DL (ref 0–99)
Lab: NORMAL
POTASSIUM SERPL-SCNC: 4.8 MMOL/L (ref 3.5–5.2)
PROT SERPL-MCNC: 7.6 G/DL (ref 6–8.5)
SODIUM SERPL-SCNC: 144 MMOL/L (ref 134–144)
TRIGL SERPL-MCNC: 81 MG/DL (ref 0–149)
VLDLC SERPL CALC-MCNC: 16 MG/DL (ref 5–40)